# Patient Record
Sex: FEMALE | Race: WHITE | NOT HISPANIC OR LATINO | Employment: FULL TIME | URBAN - METROPOLITAN AREA
[De-identification: names, ages, dates, MRNs, and addresses within clinical notes are randomized per-mention and may not be internally consistent; named-entity substitution may affect disease eponyms.]

---

## 2021-02-27 ENCOUNTER — HOSPITAL ENCOUNTER (EMERGENCY)
Facility: MEDICAL CENTER | Age: 31
End: 2021-02-27
Attending: EMERGENCY MEDICINE
Payer: COMMERCIAL

## 2021-02-27 VITALS
DIASTOLIC BLOOD PRESSURE: 65 MMHG | TEMPERATURE: 98 F | HEART RATE: 69 BPM | OXYGEN SATURATION: 97 % | RESPIRATION RATE: 16 BRPM | WEIGHT: 169.31 LBS | BODY MASS INDEX: 22.93 KG/M2 | SYSTOLIC BLOOD PRESSURE: 106 MMHG | HEIGHT: 72 IN

## 2021-02-27 DIAGNOSIS — M51.26 LUMBAR HERNIATED DISC: ICD-10-CM

## 2021-02-27 DIAGNOSIS — M54.32 SCIATICA OF LEFT SIDE: ICD-10-CM

## 2021-02-27 PROCEDURE — 700102 HCHG RX REV CODE 250 W/ 637 OVERRIDE(OP): Performed by: EMERGENCY MEDICINE

## 2021-02-27 PROCEDURE — A9270 NON-COVERED ITEM OR SERVICE: HCPCS | Performed by: EMERGENCY MEDICINE

## 2021-02-27 PROCEDURE — 99284 EMERGENCY DEPT VISIT MOD MDM: CPT

## 2021-02-27 RX ORDER — HYDROCODONE BITARTRATE AND ACETAMINOPHEN 5; 325 MG/1; MG/1
1 TABLET ORAL ONCE
Status: COMPLETED | OUTPATIENT
Start: 2021-02-27 | End: 2021-02-27

## 2021-02-27 RX ORDER — CYCLOBENZAPRINE HCL 10 MG
10 TABLET ORAL ONCE
Status: COMPLETED | OUTPATIENT
Start: 2021-02-27 | End: 2021-02-27

## 2021-02-27 RX ORDER — IBUPROFEN 600 MG/1
600 TABLET ORAL ONCE
Status: COMPLETED | OUTPATIENT
Start: 2021-02-27 | End: 2021-02-27

## 2021-02-27 RX ORDER — METHYLPREDNISOLONE 4 MG/1
TABLET ORAL
Qty: 1 EACH | Refills: 0 | Status: SHIPPED | OUTPATIENT
Start: 2021-02-27 | End: 2021-05-24

## 2021-02-27 RX ORDER — CYCLOBENZAPRINE HCL 10 MG
10 TABLET ORAL 3 TIMES DAILY PRN
Qty: 20 TABLET | Refills: 0 | Status: SHIPPED | OUTPATIENT
Start: 2021-02-27 | End: 2021-04-05 | Stop reason: SDUPTHER

## 2021-02-27 RX ADMIN — CYCLOBENZAPRINE 10 MG: 10 TABLET, FILM COATED ORAL at 11:24

## 2021-02-27 RX ADMIN — IBUPROFEN 600 MG: 600 TABLET, FILM COATED ORAL at 11:24

## 2021-02-27 RX ADMIN — HYDROCODONE BITARTRATE AND ACETAMINOPHEN 1 TABLET: 5; 325 TABLET ORAL at 11:24

## 2021-02-27 ASSESSMENT — LIFESTYLE VARIABLES: DO YOU DRINK ALCOHOL: NO

## 2021-02-27 NOTE — ED TRIAGE NOTES
".  Chief Complaint   Patient presents with   • Low Back Pain     Left sided.    • Leg Pain     Left \" pain upper area, tingling below the knee.    Pt states was \" lifting an attraction, heard a pop, then my left foot went numb shortly after that. \"  Injury occurred last evening at 1915.  Pt ambulatory with limp and \" extreme pain. \"  Pt able to wiggle toes, \" just tingling. \"  Pain radiates \" up and down my leg. \"      Pt educated on the triage process.  Pt was instructed to notify staff for any worsening symptoms.  Pt denies any recent travel, denies exposure to COVID positive individuals.  Pt also denies any respiratory or flu like symptoms at this time.  Mask in place.     "

## 2021-02-27 NOTE — LETTER
FORM C-4:  EMPLOYEE’S CLAIM FOR COMPENSATION/ REPORT OF INITIAL TREATMENT  EMPLOYEE’S CLAIM - PROVIDE ALL INFORMATION REQUESTED   First Name Farzaneh Last Name Dave Birthdate 1990  Sex female Claim Number   Home Address [address]             [place] [address]                                   Age  31 y.o. Height  1.829 m (6') Weight  76.8 kg (169 lb 5 oz) 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 (if applicable) LIFTING A NEW GAME    How did this injury or occupational disease occur? Be specific and answer in detail. Use additional sheet if necessary)  I WAS HELPING TO ASSEMBLE THE [employer] CAR AND WHEN I LIFTED IT SOMETHING POPPED IN MY LOWER BACK   If you believe that you have an occupational disease, when did you first have knowledge of the disability and it relationship to your employment? N/A Witnesses to the Accident  MADDY MCCLENDON   Nature of Injury or Occupational Disease  Workers' Compensation Part(s) of Body Injured or Affected  Lower Back Area (Lumbar Area & Lumbo-Sacral), N/A, N/A    I CERTIFY THAT THE ABOVE IS TRUE AND CORRECT TO THE BEST OF MY KNOWLEDGE AND THAT I HAVE PROVIDED THIS INFORMATION IN ORDER TO OBTAIN THE BENEFITS OF [place]).  I HEREBY AUTHORIZE ANY PHYSICIAN, CHIROPRACTOR, SURGEON, PRACTITIONER, OR OTHER PERSON, ANY HOSPITAL, INCLUDING [facility], ANY MEDICAL SERVICE ORGANIZATION, ANY INSURANCE COMPANY, OR OTHER INSTITUTION OR ORGANIZATION TO RELEASE TO EACH OTHER, ANY MEDICAL OR OTHER INFORMATION, INCLUDING BENEFITS PAID OR PAYABLE, PERTINENT TO THIS INJURY OR DISEASE, EXCEPT INFORMATION RELATIVE TO DIAGNOSIS, TREATMENT AND/OR COUNSELING FOR AIDS, PSYCHOLOGICAL CONDITIONS, ALCOHOL OR CONTROLLED SUBSTANCES, FOR WHICH I MUST GIVE SPECIFIC AUTHORIZATION.  A PHOTOSTAT OF THIS AUTHORIZATION SHALL BE AS VALID AS THE ORIGINAL.  Date 02/27/2021                 Place   [facility]                                         Employee’s Signature   THIS REPORT MUST BE COMPLETED AND MAILED WITHIN 3 WORKING DAYS OF TREATMENT   Place [facility], EMERGENCY DEPT                       Name of Facility [facility]   Date  2/27/2021 Diagnosis  (M54.32) Sciatica of left side  (M51.26) Lumbar herniated disc Is there evidence the injured employee was under the influence of alcohol and/or another controlled substance at the time of accident?   Hour  12:45 PM Description of Injury or Disease  Sciatica of left side  Lumbar herniated disc No   Treatment  H&P  Have you advised the patient to remain off work five days or more?         No   X-Ray Findings    If Yes   From Date    To Date      From information given by the employee, together with medical evidence, can you directly connect this injury or occupational disease as job incurred? Yes If No, is employee capable of: Full Duty  No Modified Duty  Yes   Is additional medical care by a physician indicated? Yes If Modified Duty, Specify any Limitations / Restrictions   Rest today and tomorrow.  No heavy lifting over 15 pounds in the next 10 days.  Adjust work duties accordingly.    Do you know of any  "previous injury or disease contributing to this condition or occupational disease? No    Date 2/27/2021 Print Doctor’s Name Carlos Durbin certify the employer’s copy of this form was mailed on:   Address [address] [place] [address]  [place] [address] [phone] INSURER’S USE ONLY   Provider’s Tax ID Number [id number] Telephone Dept: [phone]    Doctor’s Signature CARLOS Damon M.D. Degree M.D.      Form C-4 (rev.10/07)                                                                         BRIEF DESCRIPTION OF RIGHTS AND BENEFITS  (Pursuant to [place] 616C.050)    Notice of Injury or Occupational Disease (Incident Report Form C-1): If an injury or occupational disease (OD) arises out of and in the course of employment, you must provide written notice to your employer as soon as practicable, but no later than 7 days after the accident or OD. Your employer shall maintain a sufficient supply of the required forms.    Claim for Compensation (Form C-4): If medical treatment is sought, the form C-4 is available at the place of initial treatment. A completed \"Claim for Compensation\" (Form C-4) must be filed within 90 days after an accident or OD. The treating physician or chiropractor must, within 3 working days after treatment, complete and mail to the employer, the employer's insurer and third-party , the Claim for Compensation.    Medical Treatment: If you require medical treatment for your on-the-job injury or OD, you may be required to select a physician or chiropractor from a list provided by your workers’ compensation insurer, if it has contracted with an Organization for Managed Care (MCO) or Preferred Provider Organization (PPO) or providers of health care. If your employer has not entered into a contract with an MCO or PPO, you may select a physician or chiropractor from the Panel of Physicians and Chiropractors. Any medical costs related to your industrial injury or OD will be paid by your " insurer.    Temporary Total Disability (TTD): If your doctor has certified that you are unable to work for a period of at least 5 consecutive days, or 5 cumulative days in a 20-day period, or places restrictions on you that your employer does not accommodate, you may be entitled to TTD compensation.    Temporary Partial Disability (TPD): If the wage you receive upon reemployment is less than the compensation for TTD to which you are entitled, the insurer may be required to pay you TPD compensation to make up the difference. TPD can only be paid for a maximum of 24 months.    Permanent Partial Disability (PPD): When your medical condition is stable and there is an indication of a PPD as a result of your injury or OD, within 30 days, your insurer must arrange for an evaluation by a rating physician or chiropractor to determine the degree of your PPD. The amount of your PPD award depends on the date of injury, the results of the PPD evaluation, your age and wage.    Permanent Total Disability (PTD): If you are medically certified by a treating physician or chiropractor as permanently and totally disabled and have been granted a PTD status by your insurer, you are entitled to receive monthly benefits not to exceed 66 2/3% of your average monthly wage. The amount of your PTD payments is subject to reduction if you previously received a lump-sum PPD award.    Vocational Rehabilitation Services: You may be eligible for vocational rehabilitation services if you are unable to return to the job due to a permanent physical impairment or permanent restrictions as a result of your injury or occupational disease.    Transportation and Per Ada Reimbursement: You may be eligible for travel expenses and per ada associated with medical treatment.    Reopening: You may be able to reopen your claim if your condition worsens after claim closure.     Appeal Process: If you disagree with a written determination issued by the insurer or  the insurer does not respond to your request, you may appeal to the Department of Administration, , by following the instructions contained in your determination letter. You must appeal the determination within 70 days from the date of the determination letter at [address], [address], [place], [place] [address], or [address], [place], [place] [address]. If you disagree with the  decision, you may appeal to the Department of Administration, . You must file your appeal within 30 days from the date of the  decision letter at [address], [address], [place], [place] [address], or [address], [address], [place], [place] [address]. If you disagree with a decision of an , you may file a petition for judicial review with the District Court. You must do so within 30 days of the Appeal Officer’s decision. You may be represented by an  at your own expense or you may contact the [facility] for possible representation.    [place]  for Injured Workers (NAIW): If you disagree with a  decision, you may request that NAIW represent you without charge at an  Hearing. For information regarding denial of benefits, you may contact the [facility] at: [address], [address], [place] [address], [phone], or [address], [place] [address], [phone]    To File a Complaint with the Division: If you wish to file a complaint with the  of the Division of Industrial Relations (DIR),  please contact the Workers’ Compensation Section, [address], [address], [place], [place] [address], telephone (141) [phone], or [address], [place], [place] [address], telephone [phone].    For assistance with Workers’ Compensation Issues: You may contact the [facility] Office for Consumer Health Assistance,  [address] [address] [facility] [address], [place] [address], Toll Free 1-866.787.8349, Web site: http://[facility].[place].gov/Programs/EVARISTO E-mail: evaristo@[facility].[place].gov  D-2 (rev. 10/20)              __________________________________________________________________                                    _________________            Employee Name / Signature                                                                                                                            Date

## 2021-02-27 NOTE — ED NOTES
Pt ambulated to hallway, verbalized relief from medications. She is now able to put weight left leg.   Informed .

## 2021-02-27 NOTE — ED PROVIDER NOTES
"ED Provider Note    CHIEF COMPLAINT  Chief Complaint   Patient presents with   • Low Back Pain     Left sided.    • Leg Pain     Left \" pain upper area, tingling below the knee.        HPI  Farzaneh Acosta is a 31 y.o. female who presents complaining of back pain and toe numbness.  The patient has been working on a 250 pound car yesterday.  She picked it up a few times with no difficulty but the last time she picked it up she felt a pop in her back and a stinging numb sensation go down her left lower extremity.  That has since resolved and now she has numbness over her great toe.  She has pain in her back that radiates towards her thigh.  It does not radiate towards her foot.  She never had this before.  She denies any change in bowel or bladder.  There is no numbness in the perineum.  There is no belly pain.  No fever chills.  No steroids or drug use.  There is no other complaint.    PAST MEDICAL HISTORY  None    FAMILY HISTORY  History reviewed. No pertinent family history.    SOCIAL HISTORY  Social History     Tobacco Use   • Smoking status: Never Smoker   • Smokeless tobacco: Never Used   Substance Use Topics   • Alcohol use: Yes   • Drug use: Not Currently     She is here with her friend who is a     SURGICAL HISTORY  History reviewed. No pertinent surgical history.    CURRENT MEDICATIONS    I have reviewed the nurses notes and/or the list brought with the patient.    ALLERGIES  Allergies   Allergen Reactions   • Sulfa Drugs Vomiting     \" I projectile vomit. \"        REVIEW OF SYSTEMS  See HPI for further details. Review of systems as above, otherwise all other systems are negative.     PHYSICAL EXAM  VITAL SIGNS: /66   Pulse 72   Temp 36.7 °C (98 °F) (Temporal)   Resp 16   Ht 1.829 m (6')   Wt 76.8 kg (169 lb 5 oz)   LMP 02/13/2021 (Approximate)   SpO2 100%   BMI 22.96 kg/m²     Constitutional: Well appearing patient in no acute distress.  Not toxic, nor ill in appearance.  HENT: Mucus " membranes moist.  Oropharynx is clear.  Eyes: Pupils equally round.  No scleral icterus.   Neck: Full nontender range of motion.  Lymphatic: No cervical lymphadenopathy noted.   Cardiovascular: Regular heart rate and rhythm.  No murmurs, rubs, nor gallop appreciated.   Thorax & Lungs: Chest is nontender.  Lungs are clear to auscultation with good air movement bilaterally.  No wheeze, rhonchi, nor rales.   Abdomen: Soft, with no tenderness, rebound nor guarding.  No mass, pulsatile mass, nor hepatosplenomegaly appreciated.  Skin: No purpura nor petechia noted.  Extremities/Musculoskeletal: No sign of trauma.  Calves are nontender with no cords nor edema.  No Destin's sign.  Pulses are intact all around.   Neurologic: Alert & oriented.  Strength and sensation is intact all around with exception of some numbness over her great toe.  Deep tendon reflexes are brisk and symmetric at the knee and ankle.  With Breanna as chaperone, I did check the perineum and there is no saddle anesthesia.  Straight leg raise elicits pain but does not cause radicular symptoms.  Gait is slow but steady according nursing report.  Psychiatric: Normal affect appropriate for the clinical situation.      MEDICAL RECORD  I have reviewed patient's medical record and pertinent results are listed above.    COURSE & MEDICAL DECISION MAKING  I have reviewed any medical record information, laboratory studies and radiographic results as noted above.  This patient presents with sudden onset of back pain while lifting with radicular symptoms.  Suspect an L4/5 disc herniation.  She has no red flag symptoms of epidural abscess or cord compression.  At this point I will treat her symptomatically.  She prefers oral therapy initially.  We will start with NSAIDs, antispasmodics and will give her a dose of hydrocodone.  Going forward, my plan will be to treat her with Medrol, and Flexeril.  We discussed red flag symptoms, and following up with occupational health  possibility of MRI depending on how she responds to therapy.    She feels better after this.  At this point, we will discharge her home.  I have asked her to follow-up with occupational health this upcoming week.  She is to rest for the first 1 to 2 days and then no heavy lifting over 15 pounds for the next 10 hours.    Instructions on sciatica as well as disc herniation.    FINAL IMPRESSION  1. Sciatica of left side    2. Lumbar herniated disc           This dictation was created using voice recognition software.    Electronically signed by: Carlos Durbin M.D., 2/27/2021 11:15 AM

## 2021-02-27 NOTE — ED NOTES
Pt discharge home. Pt given discharge instructions and prescription. Pt verbalized understanding, all questions answered ,vss upon d/c. Pt steady on feet upon discharge  Friend will be driving pt home

## 2021-04-05 ENCOUNTER — OCCUPATIONAL MEDICINE (OUTPATIENT)
Dept: OCCUPATIONAL MEDICINE | Facility: CLINIC | Age: 31
End: 2021-04-05
Payer: COMMERCIAL

## 2021-04-05 VITALS
DIASTOLIC BLOOD PRESSURE: 70 MMHG | RESPIRATION RATE: 16 BRPM | WEIGHT: 172 LBS | SYSTOLIC BLOOD PRESSURE: 114 MMHG | HEART RATE: 87 BPM | OXYGEN SATURATION: 100 % | TEMPERATURE: 98.3 F | BODY MASS INDEX: 23.3 KG/M2 | HEIGHT: 72 IN

## 2021-04-05 DIAGNOSIS — M54.16 LUMBAR RADICULOPATHY: ICD-10-CM

## 2021-04-05 DIAGNOSIS — M54.32 SCIATICA OF LEFT SIDE: ICD-10-CM

## 2021-04-05 PROCEDURE — 99213 OFFICE O/P EST LOW 20 MIN: CPT | Performed by: NURSE PRACTITIONER

## 2021-04-05 RX ORDER — CYCLOBENZAPRINE HCL 10 MG
10 TABLET ORAL 3 TIMES DAILY PRN
Qty: 20 TABLET | Refills: 0 | Status: SHIPPED | OUTPATIENT
Start: 2021-04-05 | End: 2021-05-24

## 2021-04-05 RX ORDER — DICLOFENAC SODIUM 75 MG/1
75 TABLET, DELAYED RELEASE ORAL 2 TIMES DAILY
Qty: 60 TABLET | Refills: 0 | Status: SHIPPED | OUTPATIENT
Start: 2021-04-05 | End: 2021-05-24

## 2021-04-05 NOTE — LETTER
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    Medical Information   Related to Industrial Injury? Yes    Subjective Complaints:  DOI 2/26/21: complaining of back pain and toe numbness.  The patient has been working on a 250 pound car yesterday.  She picked it up a few times with no difficulty but the last time she picked it up she felt a pop in her back and a stinging numb sensation go down her left lower extremity.  Today no improvement.  Pain is radiating from the left to the right lower back, then down the left groin to mid thigh, and sometimes to the toe. Patient denies leg weakness, saddle anesthesia, or bowel bladder dysfunction. Patient states that she is taking cyclobenzaprine at night with some relief. She states that she the prednisone took some of the edge off. Patient is using heat with mild relief. She is taking Ibuprofen with no relief. She has not had any follow-up since the ED. She has been light duty for about 10 days, but has been lifting 25lb backpacks repeatedly which significantly increased her symptoms. Placed on light duty at this time. MRI and neurosurgery referral placed. Plan of care discussed with patient.    Objective Findings: Lumbar: No gross deformity noted.  Moderate tenderness to paraspinal musculature L3-S1 and left SI joint. Moderately decreased flexion or rotation.  Straight leg test positive on the left, neg on the right.  Unable to heel/toe walk with moderate difficulty. Cranial nerves grossly intact.  Achilles and patellar reflexes 2+ bilaterally.   Sensation intact. No gait abnormalities, slow and steady.      Pre-Existing Condition(s):     Assessment:   Condition Worsened    Status: Discharged / Care Transfer  Permanent Disability:No    Plan: Medication (NOT at Work)Transfer [facility]    Diagnostics: MRI    Comments:  Follow-up in 1 week  Restricted duty   MRI ordered   Neurosurgery referral placed, transfer   Take Diclofenac with food as prescribed  Take cyclobenzaprine as prescribed DO NOT DRIVE or WORK while taking this medication due to sedating effects   Jarod  mmend OTC Tylenol/Ibuprofen for breakthrough, ice/heat application, and OTC topical ointments ie Voltaren Gel, Bio-freeze, or Tiger Balm  Recommend gentle range of motion and stretching exercises as tolerated       Pt understands return to the ED imm  ediately for immediate reevaluation for increased pain, numbness/weakness of lower extremities, or incontinence of bowel or bladder.         Disability Information   Status: Released to Restricted Duty    From:  2021  Through: 2021 Restrictions are: Temporary   Physical Restrictions   Sitting:  < or = to 2 hrs/day Standing:  < or = to 2 hrs/day Stooping:  < or = to 1 hr/day Bending:  < or = to 1 hr/day   Squattin hrs/day Walking:  < or = to 2 hrs/day Climbin hrs/day Pushin hrs/day   Pullin hrs/day Other:    Reaching Above Shoulder (L):   Reaching Above Shoulder (R):       Reaching Below Shoulder (L):    Reaching Below Shoulder (R):      Not to exceed Weight Limits   Carrying(hrs):   Weight Limit(lb): < or = to 10 pounds Lifting(hrs):   Weight  Limit(lb): < or = to 10 pounds   Comments: Recommend rotating sitting, standing, and walking every 2 hours with a 10 minute break     Repetitive Actions   Hands: i.e. Fine Manipulations from Grasping:     Feet: i.e. Operating Foot Controls:        Driving / Operate Machinery:     Provider Name:   PATRICIO Nielsen Physician Signature:  Physician Name:     Clinic Name / Location: [address],   [address]  [place] [address] Clinic Phone Number: Dept: [phone]   Appointment Time: 1:30 Pm Visit Start Time: 1:21 PM   Check-In Time:  1:21 Pm Visit Discharge Time:  2:06pm   Original-Treating Physician or Chiropractor    Page 2-Insurer/TPA    Page 3-Employer    Page 4-Employee

## 2021-04-05 NOTE — PROGRESS NOTES
"Subjective:     Farzaneh Acosta is a 31 y.o. female who presents for Follow-Up (WC New To You DOI 2/26/2021 Back; Same RM 16)      DOI 2/26/21: complaining of back pain and toe numbness.  The patient has been working on a 250 pound car yesterday.  She picked it up a few times with no difficulty but the last time she picked it up she felt a pop in her back and a stinging numb sensation go down her left lower extremity.  Today no improvement.  Pain is radiating from the left to the right lower back, then down the left groin to mid thigh, and sometimes to the toe. Patient denies leg weakness, saddle anesthesia, or bowel bladder dysfunction. Patient states that she is taking cyclobenzaprine at night with some relief. She states that she the prednisone took some of the edge off. Patient is using heat with mild relief. She is taking Ibuprofen with no relief. She has not had any follow-up since the ED. She has been light duty for about 10 days, but has been lifting 25lb backpacks repeatedly which significantly increased her symptoms. Placed on light duty at this time. MRI and neurosurgery referral placed. Plan of care discussed with patient.     ROS: All systems were reviewed on intake form, form was reviewed and signed. See scanned documents in media. Pertinent positives and negatives included in HPI.    PMH: No pertinent past medical history to this problem  MEDS: Medications were reviewed in Epic  ALLERGIES:   Allergies   Allergen Reactions   • Sulfa Drugs Vomiting     \" I projectile vomit. \"      SOCHX: Works as  at [employer]  FH: No pertinent family history to this problem       Objective:     /70   Pulse 87   Temp 36.8 °C (98.3 °F)   Resp 16   Ht 1.829 m (6')   Wt 78 kg (172 lb)   SpO2 100%   BMI 23.33 kg/m²     [unfilled]    Lumbar: No gross deformity noted.  Moderate tenderness to paraspinal musculature L3-S1 and left SI joint. Moderately decreased flexion or " rotation.  Straight leg test positive on the left, neg on the right. Unable to heel/toe walk with moderate difficulty. Cranial nerves grossly intact.  Achilles and patellar reflexes 2+ bilaterally.   Sensation intact. No gait abnormalities, slow and steady.       Assessment/Plan:       1. Lumbar radiculopathy  - MR-LUMBAR SPINE-W/O; Future  - REFERRAL TO RADIOLOGY  - REFERRAL TO NEUROSURGERY  - diclofenac DR (VOLTAREN) 75 MG Tablet Delayed Response; Take 1 tablet by mouth 2 times a day.  Dispense: 60 tablet; Refill: 0  - cyclobenzaprine (FLEXERIL) 10 mg Tab; Take 1 tablet by mouth 3 times a day as needed for Muscle Spasms.  Dispense: 20 tablet; Refill: 0    2. Sciatica of left side  - MR-LUMBAR SPINE-W/O; Future  - REFERRAL TO RADIOLOGY  - REFERRAL TO NEUROSURGERY  - diclofenac DR (VOLTAREN) 75 MG Tablet Delayed Response; Take 1 tablet by mouth 2 times a day.  Dispense: 60 tablet; Refill: 0  - cyclobenzaprine (FLEXERIL) 10 mg Tab; Take 1 tablet by mouth 3 times a day as needed for Muscle Spasms.  Dispense: 20 tablet; Refill: 0    Released to Restricted Duty FROM 4/5/2021 TO 4/12/2021  Recommend rotating sitting, standing, and walking every 2 hours with a 10 minute break   Follow-up in 1 week  Restricted duty   MRI ordered   Neurosurgery referral placed, transfer   Take Diclofenac with food as prescribed  Take cyclobenzaprine as prescribed DO NOT DRIVE or WORK while taking this medication due to sedating effects   Jarod  mmend OTC Tylenol/Ibuprofen for breakthrough, ice/heat application, and OTC topical ointments ie Voltaren Gel, Bio-freeze, or Tiger Balm  Recommend gentle range of motion and stretching exercises as tolerated       Pt understands return to the ED imm  ediately for immediate reevaluation for increased pain, numbness/weakness of lower extremities, or incontinence of bowel or bladder.         Differential diagnosis, natural history, supportive care, and indications for immediate follow-up  discussed.    Approximately 25 minutes were spent in reviewing notes, preparing for visit, obtaining history, exam and evaluation, patient counseling/education and post visit documentation/orders.

## 2021-04-12 ENCOUNTER — OCCUPATIONAL MEDICINE (OUTPATIENT)
Dept: OCCUPATIONAL MEDICINE | Facility: CLINIC | Age: 31
End: 2021-04-12
Payer: COMMERCIAL

## 2021-04-12 VITALS
RESPIRATION RATE: 16 BRPM | BODY MASS INDEX: 22.48 KG/M2 | HEIGHT: 72 IN | SYSTOLIC BLOOD PRESSURE: 118 MMHG | WEIGHT: 166 LBS | HEART RATE: 66 BPM | DIASTOLIC BLOOD PRESSURE: 78 MMHG | TEMPERATURE: 98.9 F | OXYGEN SATURATION: 98 %

## 2021-04-12 DIAGNOSIS — M54.16 LUMBAR RADICULOPATHY: ICD-10-CM

## 2021-04-12 DIAGNOSIS — M54.32 SCIATICA OF LEFT SIDE: ICD-10-CM

## 2021-04-12 PROCEDURE — 99213 OFFICE O/P EST LOW 20 MIN: CPT | Performed by: NURSE PRACTITIONER

## 2021-04-12 ASSESSMENT — ENCOUNTER SYMPTOMS
SENSORY CHANGE: 1
TINGLING: 0
WEAKNESS: 0
CARDIOVASCULAR NEGATIVE: 1
CONSTITUTIONAL NEGATIVE: 1
BACK PAIN: 1
PSYCHIATRIC NEGATIVE: 1
RESPIRATORY NEGATIVE: 1
MYALGIAS: 1

## 2021-04-12 NOTE — LETTER
[address],   [address] [place] [address]  Phone:  [phone] - Fax:  [phone]   Occupational Health [facility] Progress Report and Disability Certification  Date of Service: 4/12/2021   No Show:  No  Date / Time of Next Visit: 5/3/2021 @ 3pm   Claim Information   Patient Name: Farzaneh Acosta  Claim Number:     Employer:  [employer] Date of Injury:      Insurer / TPA: [employer]  ID / SSN:     Occupation:   Diagnosis: Diagnoses of Lumbar radiculopathy and Sciatica of left side were pertinent to this visit.    Medical Information   Related to Industrial Injury? Yes    Subjective Complaints:  DOI 2/26/21: complaining of back pain and toe numbness.  The patient has been working on a 250 pound car yesterday.  She picked it up a few times with no difficulty but the last time she picked it up she felt a pop in her back and a stinging numb sensation go down her left lower extremity.  Today mild improvement.  Pain is no longer really radiating from the right to the left lower back, but she does continue to have some sciatica that radiates down her left groin to her mid thigh.  She states that radiation down the left groin has become less frequent than previously with no continued radiation to the toe. Patient denies leg weakness, saddle anesthesia, or bowel bladder dysfunction. Patient states that she is taking cyclobenzaprine at night with some relief. She states that she the prednisone took some of the edge off. Patient is using heat and diclofenac with mild relief.  She has been tolerating light duty without difficulty, feels that this is significantly improved her symptoms from previous visit. MRI results reviewed with patient.  Feel that physiatry referral will be more appropriate to neurosurgery based on MRI exam findings.  Physiatry referral placed for further evaluation and management.  Physical therapy referral placed.  Plan of care  discussed with patient.    Objective Findings: Lumbar: No gross deformity noted.  Mild tenderness to paraspinal musculature L3-S1 and left SI joint.  Mildly decreased flexion or rotation.  Straight leg test positive on the left, neg on the right.  Able to heel/toe walk with minimal. Cranial nerves grossly intact.  Achilles and patellar reflexes 2+ bilaterally.   Sensation intact. No gait abnormalities, slow and steady.     MRI lumbar 2021:  Impression:  Mild degenerative endplate changes and facet arthropathy but no nerve root impingement to suggest radiculopathy.   Pre-Existing Condition(s):     Assessment:   Condition Improved    Status: Discharged / Care Transfer  Permanent Disability:No    Plan: PTMedication (NOT at Work)MedicationTransfer Care    Diagnostics:      Comments:  Follow-up in 3 weeks, if not seen by physiatry  Restricted duty, per physiatry  Physiatry referral placed, transfer care  Physical therapy referral placed  Take Diclofenac with food as prescribed   Take cyclobenzaprine as prescribed DO NOT DRIVE or W  ORK while taking this medication due to sedating effects   Recommend OTC Tylenol/Ibuprofen for breakthrough, ice/heat application, and OTC topical ointments ie Voltaren Gel, Bio-freeze, or Tiger Balm   Recommend gentle range of motion and stretching   exercises as tolerated             Pt understands return to the ED immediately for immediate reevaluation for increased pain, numbness/weakness of lower extremities, or incontinence of bowel or bladder.        Disability Information   Status: Released to Restricted Duty    From:  2021  Through: 5/3/2021 Restrictions are: Temporary   Physical Restrictions   Sitting:  < or = to 2 hrs/day Standing:  < or = to 2 hrs/day Stooping:  < or = to 1 hr/day Bending:  < or = to 1 hr/day   Squattin hrs/day Walking:  < or = to 2 hrs/day Climbin hrs/day Pushin hrs/day   Pullin hrs/day Other:    Reaching Above Shoulder (L):   Reaching  Above Shoulder (R):       Reaching Below Shoulder (L):    Reaching Below Shoulder (R):      Not to exceed Weight Limits   Carrying(hrs):   Weight Limit(lb): < or = to 10 pounds Lifting(hrs):   Weight  Limit(lb): < or = to 10 pounds   Comments: Recommend rotating sitting, standing, and walking every 2 hours with a 10 minute break     Repetitive Actions   Hands: i.e. Fine Manipulations from Grasping:     Feet: i.e. Operating Foot Controls:     Driving / Operate Machinery:     Provider Name:   PATRICIO Nielsen Physician Signature:  Physician Name:     Clinic Name / Location: [address],   [address] [place] [id number] Clinic Phone Number: Dept: [phone]   Appointment Time: 3:00 Pm Visit Start Time: 3:01 PM   Check-In Time:  2:56 Pm Visit Discharge Time:  3:40am   Original-Treating Physician or Chiropractor    Page 2-Insurer/TPA    Page 3-Employer    Page 4-Employee

## 2021-04-13 NOTE — PROGRESS NOTES
Subjective:     Farzaneh Acosta is a 31 y.o. female who presents for Follow-Up (WC DOI: 2/26/2021 Back; Better RM 17)      DOI 2/26/21: complaining of back pain and toe numbness.  The patient has been working on a 250 pound car yesterday.  She picked it up a few times with no difficulty but the last time she picked it up she felt a pop in her back and a stinging numb sensation go down her left lower extremity.  Today mild improvement.  Pain is no longer really radiating from the right to the left lower back, but she does continue to have some sciatica that radiates down her left groin to her mid thigh.  She states that radiation down the left groin has become less frequent than previously with no continued radiation to the toe. Patient denies leg weakness, saddle anesthesia, or bowel bladder dysfunction. Patient states that she is taking cyclobenzaprine at night with some relief. She states that she the prednisone took some of the edge off. Patient is using heat and diclofenac with mild relief.  She has been tolerating light duty without difficulty, feels that this is significantly improved her symptoms from previous visit. MRI results reviewed with patient.  Feel that physiatry referral will be more appropriate to neurosurgery based on MRI exam findings.  Physiatry referral placed for further evaluation and management.  Physical therapy referral placed.  Plan of care discussed with patient.     Review of Systems   Constitutional: Negative.    Respiratory: Negative.    Cardiovascular: Negative.    Musculoskeletal: Positive for back pain and myalgias. Negative for joint pain.   Skin: Negative.    Neurological: Positive for sensory change. Negative for tingling and weakness.   Psychiatric/Behavioral: Negative.        SOCHX: Works as  at [employer]  FH: No pertinent family history to this problem       Objective:     /78   Pulse 66   Temp 37.2 °C (98.9 °F)   Resp 16   Ht  1.829 m (6')   Wt 75.3 kg (166 lb)   SpO2 98%   BMI 22.51 kg/m²     Constitutional: Patient is in no acute distress. Appears well-developed and well-nourished.   Cardiovascular: Normal rate.    Pulmonary/Chest: Effort normal. No respiratory distress.   Neurological: Patient is alert and oriented to person, place, and time.   Skin: Skin is warm and dry.   Psychiatric: Normal mood and affect. Behavior is normal.     Lumbar: No gross deformity noted.  Mild tenderness to paraspinal musculature L3-S1 and left SI joint.  Mildly decreased flexion or rotation.  Straight leg test positive on the left, neg on the right.  Able to heel/toe walk with minimal. Cranial nerves grossly intact.  Achilles and patellar reflexes 2+ bilaterally.   Sensation intact. No gait abnormalities, slow and steady.     MRI lumbar 4/8/2021:  Impression:  Mild degenerative endplate changes and facet arthropathy but no nerve root impingement to suggest radiculopathy.    Assessment/Plan:       1. Lumbar radiculopathy  - REFERRAL TO OUTPATIENT INTERVENTIONAL PAIN CLINIC  - REFERRAL TO PHYSICAL THERAPY    2. Sciatica of left side  - REFERRAL TO OUTPATIENT INTERVENTIONAL PAIN CLINIC  - REFERRAL TO PHYSICAL THERAPY    Released to Restricted Duty FROM 4/12/2021 TO 5/3/2021  Recommend rotating sitting, standing, and walking every 2 hours with a 10 minute break     Follow-up in 3 weeks, if not seen by physiatry  Restricted duty, per physiatry  Physiatry referral placed, transfer care  Physical therapy referral placed  Take Diclofenac with food as prescribed   Take cyclobenzaprine as prescribed DO NOT DRIVE or W  ORK while taking this medication due to sedating effects   Recommend OTC Tylenol/Ibuprofen for breakthrough, ice/heat application, and OTC topical ointments ie Voltaren Gel, Bio-freeze, or Tiger Balm   Recommend gentle range of motion and stretching   exercises as tolerated             Pt understands return to the ED immediately for immediate  reevaluation for increased pain, numbness/weakness of lower extremities, or incontinence of bowel or bladder.        Differential diagnosis, natural history, supportive care, and indications for immediate follow-up discussed.    Approximately 25 minutes was spent in preparing for visit, obtaining history, exam and evaluation, patient counseling/education and post visit documentation/orders.

## 2021-05-24 ENCOUNTER — OFFICE VISIT (OUTPATIENT)
Dept: URGENT CARE | Facility: PHYSICIAN GROUP | Age: 31
End: 2021-05-24

## 2021-05-24 VITALS
TEMPERATURE: 99.3 F | SYSTOLIC BLOOD PRESSURE: 108 MMHG | HEIGHT: 72 IN | RESPIRATION RATE: 18 BRPM | DIASTOLIC BLOOD PRESSURE: 64 MMHG | OXYGEN SATURATION: 98 % | HEART RATE: 87 BPM | BODY MASS INDEX: 22.48 KG/M2 | WEIGHT: 166 LBS

## 2021-05-24 DIAGNOSIS — Z3A.01 LESS THAN 8 WEEKS GESTATION OF PREGNANCY: ICD-10-CM

## 2021-05-24 DIAGNOSIS — N91.2 AMENORRHEA: ICD-10-CM

## 2021-05-24 LAB
INT CON NEG: NORMAL
INT CON POS: NORMAL
POC URINE PREGNANCY TEST: POSITIVE

## 2021-05-24 PROCEDURE — 99203 OFFICE O/P NEW LOW 30 MIN: CPT | Performed by: STUDENT IN AN ORGANIZED HEALTH CARE EDUCATION/TRAINING PROGRAM

## 2021-05-24 PROCEDURE — 81025 URINE PREGNANCY TEST: CPT | Performed by: STUDENT IN AN ORGANIZED HEALTH CARE EDUCATION/TRAINING PROGRAM

## 2021-05-24 NOTE — PROGRESS NOTES
"Subjective:   CHIEF COMPLAINT  Chief Complaint   Patient presents with   • Pregnancy Test     wants confirmation of pregnancy test, positive test at home on saturday        HPI  Farzaneh Acosta is a 31 y.o. female who presents requesting pregnancy test.  She reports she had a positive test at home.  Her last first day of her menstrual period was .  She has 2 other children.  Just moved to the area and does not have an OB/GYN.    REVIEW OF SYSTEMS  General: no fever or chills  GI: no nausea or vomiting  See HPI for further details.    PAST MEDICAL HISTORY  There are no problems to display for this patient.      SURGICAL HISTORY  patient denies any surgical history    ALLERGIES  Allergies   Allergen Reactions   • Sulfa Drugs Vomiting     \" I projectile vomit. \"        CURRENT MEDICATIONS  Home Medications     Reviewed by Brian Valentino D.O. (Physician) on 21 at [facility]  Med List Status: <None>   Medication Last Dose Status        Patient Randal Taking any Medications                       SOCIAL HISTORY  Social History     Tobacco Use   • Smoking status: Never Smoker   • Smokeless tobacco: Never Used   Substance and Sexual Activity   • Alcohol use: Yes   • Drug use: Not Currently   • Sexual activity: Not on file       FAMILY HISTORY  No family history on file.       Objective:   PHYSICAL EXAM  VITAL SIGNS: /64 (BP Location: Left arm, Patient Position: Sitting, BP Cuff Size: Adult)   Pulse 87   Temp 37.4 °C (99.3 °F) (Temporal)   Resp 18   Ht 1.829 m (6')   Wt 75.3 kg (166 lb)   SpO2 98%   BMI 22.51 kg/m²     Gen: no acute distress, normal voice  Skin: dry, intact, moist mucosal membranes  Lungs: CTAB w/ symmetric expansion  CV: RRR w/o murmurs or clicks  Psych: normal affect, normal judgement, alert, awake    hCG: Positive    Assessment/Plan:     1. Amenorrhea  POCT Pregnancy    REFERRAL TO OB/GYN   2. Less than 8 weeks gestation of pregnancy  REFERRAL TO OB/GYN    now@ 4wk 1 day. FLMP " 4/25/21.  Unplanned; patient was on birth control.  She is new to the area.  -ordered referral to establish with OB/GYN for continued management.    Differential diagnosis, natural history, supportive care, and indications for immediate follow-up discussed. All questions answered. Patient agrees with the plan of care.    Follow-up as needed if symptoms worsen or fail to improve to PCP, Urgent care or Emergency Room.    Please note that this dictation was created using voice recognition software. I have made a reasonable attempt to correct obvious errors, but I expect that there are errors of grammar and possibly content that I did not discover before finalizing the note.

## 2021-05-24 NOTE — PROGRESS NOTES
"Subjective:   CHIEF COMPLAINT  Chief Complaint   Patient presents with   • Pregnancy Test     wants confirmation of pregnancy test, positive test at home on saturday        HPI  Farzaneh Acosta is a 31 y.o. female who presents ***    REVIEW OF SYSTEMS  General: no fever or chills  GI: no nausea or vomiting  See HPI for further details.    PAST MEDICAL HISTORY  There are no problems to display for this patient.      SURGICAL HISTORY  patient denies any surgical history    ALLERGIES  Allergies   Allergen Reactions   • Sulfa Drugs Vomiting     \" I projectile vomit. \"        CURRENT MEDICATIONS  Home Medications     Reviewed by Brian Valentino D.O. (Physician) on 05/24/21 at 1017  Med List Status: <None>   Medication Last Dose Status        Patient Randal Taking any Medications                       SOCIAL HISTORY  Social History     Tobacco Use   • Smoking status: Never Smoker   • Smokeless tobacco: Never Used   Substance and Sexual Activity   • Alcohol use: Yes   • Drug use: Not Currently   • Sexual activity: Not on file       FAMILY HISTORY  No family history on file.       Objective:   PHYSICAL EXAM  VITAL SIGNS: /64 (BP Location: Left arm, Patient Position: Sitting, BP Cuff Size: Adult)   Pulse 87   Temp 37.4 °C (99.3 °F) (Temporal)   Resp 18   Ht 1.829 m (6')   Wt 75.3 kg (166 lb)   SpO2 98%   BMI 22.51 kg/m²     Gen: no acute distress, normal voice  Skin: dry, intact, moist mucosal membranes  Lungs: CTAB w/ symmetric expansion  CV: RRR w/o murmurs or clicks  Psych: normal affect, normal judgement, alert, awake    Assessment/Plan:     1. Amenorrhea  POCT Pregnancy       Differential diagnosis, natural history, supportive care, and indications for immediate follow-up discussed. All questions answered. Patient agrees with the plan of care.    Follow-up as needed if symptoms worsen or fail to improve to PCP, Urgent care or Emergency Room.    Please note that this dictation was created using voice " recognition software. I have made a reasonable attempt to correct obvious errors, but I expect that there are errors of grammar and possibly content that I did not discover before finalizing the note.

## 2021-06-14 ENCOUNTER — OFFICE VISIT (OUTPATIENT)
Dept: URGENT CARE | Facility: PHYSICIAN GROUP | Age: 31
End: 2021-06-14
Payer: COMMERCIAL

## 2021-06-14 VITALS
RESPIRATION RATE: 20 BRPM | BODY MASS INDEX: 22.48 KG/M2 | HEART RATE: 99 BPM | OXYGEN SATURATION: 99 % | SYSTOLIC BLOOD PRESSURE: 98 MMHG | WEIGHT: 166 LBS | HEIGHT: 72 IN | TEMPERATURE: 99.6 F | DIASTOLIC BLOOD PRESSURE: 48 MMHG

## 2021-06-14 DIAGNOSIS — S93.491A SPRAIN OF ANTERIOR TALOFIBULAR LIGAMENT OF RIGHT ANKLE, INITIAL ENCOUNTER: ICD-10-CM

## 2021-06-14 PROCEDURE — 99213 OFFICE O/P EST LOW 20 MIN: CPT | Performed by: FAMILY MEDICINE

## 2021-06-14 NOTE — PROGRESS NOTES
Subjective:      Farzaneh Acosta is a 31 y.o. female who presents with Ankle Injury (stepped down off of stair, injuring right ankle x 2.5 hours patient states hse is 7 weeks pregnant)    - This is a pleasant and nontoxic appearing 31 y.o. female with c/o rolled/twisted Rt foot today stepping down from curb. Hurts to put weight on it and walk.       ALLERGIES:  Sulfa drugs     PMH:  History reviewed. No pertinent past medical history.     PSH:  History reviewed. No pertinent surgical history.    MEDS:    Current Outpatient Medications:   •  Prenatal Vit-Fe Fumarate-FA (PRENATAL VITAMINS PO), Take  by mouth., Disp: , Rfl:     ** I have documented what I find to be significant in regards to past medical, social, family and surgical history  in my HPI or under PMH/PSH/FH review section, otherwise it is noncontributory **             HPI    Review of Systems   Musculoskeletal: Positive for joint pain.   All other systems reviewed and are negative.         Objective:     BP (!) 98/48   Pulse 99   Temp 37.6 °C (99.6 °F)   Resp 20   Ht 1.829 m (6')   Wt 75.3 kg (166 lb)   LMP  (Approximate)   SpO2 99%   BMI 22.51 kg/m²      Physical Exam  Vitals and nursing note reviewed.   Constitutional:       General: She is not in acute distress.     Appearance: Normal appearance. She is well-developed.   HENT:      Head: Normocephalic and atraumatic.   Cardiovascular:      Heart sounds: Normal heart sounds. No murmur heard.     Pulmonary:      Effort: Pulmonary effort is normal. No respiratory distress.   Musculoskeletal:        Feet:    Feet:      Comments: Rt foot: no discoloration or wounds, trace edema. Some TTP over marked areas. Limited ROM due to pain. Normal cap refill   Skin:     Coloration: Skin is not jaundiced or pale.   Neurological:      Mental Status: She is alert.      Motor: No abnormal muscle tone.   Psychiatric:         Mood and Affect: Mood normal.         Behavior: Behavior normal.       Assessment/Plan:           1. Sprain of anterior talofibular ligament of right ankle, initial encounter  REFERRAL TO SPORTS MEDICINE    CANCELED: DX-ANKLE 3+ VIEWS RIGHT     ** declined X-Ray tat this time as in 7wks pregnant and worried about radiation risk to fetus     - Dx, plan & d/c instructions discussed w/ patient and/or family members  - RICE, OTC Tylenol as needed  - E.R. precautions discussed     Follow up with sports-med in 2-3 days, ER if not improving or feeling/getting worse.    Any realistic side effects of medications that may have been given today reviewed.     Patient left in stable condition     Please note that this dictation was created using voice recognition software. I have made every reasonable attempt to correct obvious errors, but I expect that there are errors of grammar and possibly content that I did not discover before finalizing the note.

## 2022-12-15 PROCEDURE — 99284 EMERGENCY DEPT VISIT MOD MDM: CPT

## 2022-12-16 ENCOUNTER — APPOINTMENT (OUTPATIENT)
Dept: RADIOLOGY | Facility: MEDICAL CENTER | Age: 32
End: 2022-12-16
Attending: EMERGENCY MEDICINE
Payer: COMMERCIAL

## 2022-12-16 ENCOUNTER — HOSPITAL ENCOUNTER (EMERGENCY)
Facility: MEDICAL CENTER | Age: 32
End: 2022-12-16
Attending: EMERGENCY MEDICINE
Payer: COMMERCIAL

## 2022-12-16 VITALS
SYSTOLIC BLOOD PRESSURE: 110 MMHG | TEMPERATURE: 98 F | WEIGHT: 168.87 LBS | HEART RATE: 85 BPM | RESPIRATION RATE: 17 BRPM | OXYGEN SATURATION: 98 % | HEIGHT: 72 IN | DIASTOLIC BLOOD PRESSURE: 65 MMHG | BODY MASS INDEX: 22.87 KG/M2

## 2022-12-16 DIAGNOSIS — O20.0 THREATENED MISCARRIAGE: ICD-10-CM

## 2022-12-16 LAB
ALBUMIN SERPL BCP-MCNC: 4.5 G/DL (ref 3.2–4.9)
ALBUMIN/GLOB SERPL: 1.5 G/DL
ALP SERPL-CCNC: 66 U/L (ref 30–99)
ALT SERPL-CCNC: 14 U/L (ref 2–50)
ANION GAP SERPL CALC-SCNC: 10 MMOL/L (ref 7–16)
AST SERPL-CCNC: 19 U/L (ref 12–45)
B-HCG SERPL-ACNC: 149 MIU/ML (ref 0–5)
BASOPHILS # BLD AUTO: 0.5 % (ref 0–1.8)
BASOPHILS # BLD: 0.03 K/UL (ref 0–0.12)
BILIRUB SERPL-MCNC: 0.5 MG/DL (ref 0.1–1.5)
BUN SERPL-MCNC: 9 MG/DL (ref 8–22)
CALCIUM ALBUM COR SERPL-MCNC: 9.5 MG/DL (ref 8.5–10.5)
CALCIUM SERPL-MCNC: 9.9 MG/DL (ref 8.5–10.5)
CHLORIDE SERPL-SCNC: 103 MMOL/L (ref 96–112)
CO2 SERPL-SCNC: 26 MMOL/L (ref 20–33)
CREAT SERPL-MCNC: 0.57 MG/DL (ref 0.5–1.4)
EOSINOPHIL # BLD AUTO: 0.08 K/UL (ref 0–0.51)
EOSINOPHIL NFR BLD: 1.2 % (ref 0–6.9)
ERYTHROCYTE [DISTWIDTH] IN BLOOD BY AUTOMATED COUNT: 42 FL (ref 35.9–50)
GFR SERPLBLD CREATININE-BSD FMLA CKD-EPI: 124 ML/MIN/1.73 M 2
GLOBULIN SER CALC-MCNC: 3.1 G/DL (ref 1.9–3.5)
GLUCOSE SERPL-MCNC: 102 MG/DL (ref 65–99)
HCG SERPL QL: POSITIVE
HCT VFR BLD AUTO: 43.4 % (ref 37–47)
HGB BLD-MCNC: 14.2 G/DL (ref 12–16)
IMM GRANULOCYTES # BLD AUTO: 0.02 K/UL (ref 0–0.11)
IMM GRANULOCYTES NFR BLD AUTO: 0.3 % (ref 0–0.9)
LYMPHOCYTES # BLD AUTO: 2 K/UL (ref 1–4.8)
LYMPHOCYTES NFR BLD: 30.4 % (ref 22–41)
MCH RBC QN AUTO: 30.1 PG (ref 27–33)
MCHC RBC AUTO-ENTMCNC: 32.7 G/DL (ref 33.6–35)
MCV RBC AUTO: 92.1 FL (ref 81.4–97.8)
MONOCYTES # BLD AUTO: 0.36 K/UL (ref 0–0.85)
MONOCYTES NFR BLD AUTO: 5.5 % (ref 0–13.4)
NEUTROPHILS # BLD AUTO: 4.08 K/UL (ref 2–7.15)
NEUTROPHILS NFR BLD: 62.1 % (ref 44–72)
NRBC # BLD AUTO: 0 K/UL
NRBC BLD-RTO: 0 /100 WBC
PLATELET # BLD AUTO: 235 K/UL (ref 164–446)
PMV BLD AUTO: 11.5 FL (ref 9–12.9)
POTASSIUM SERPL-SCNC: 3.9 MMOL/L (ref 3.6–5.5)
PROT SERPL-MCNC: 7.6 G/DL (ref 6–8.2)
RBC # BLD AUTO: 4.71 M/UL (ref 4.2–5.4)
SODIUM SERPL-SCNC: 139 MMOL/L (ref 135–145)
WBC # BLD AUTO: 6.6 K/UL (ref 4.8–10.8)

## 2022-12-16 PROCEDURE — 80053 COMPREHEN METABOLIC PANEL: CPT

## 2022-12-16 PROCEDURE — 84703 CHORIONIC GONADOTROPIN ASSAY: CPT

## 2022-12-16 PROCEDURE — 76801 OB US < 14 WKS SINGLE FETUS: CPT

## 2022-12-16 PROCEDURE — A9270 NON-COVERED ITEM OR SERVICE: HCPCS | Performed by: EMERGENCY MEDICINE

## 2022-12-16 PROCEDURE — 85025 COMPLETE CBC W/AUTO DIFF WBC: CPT

## 2022-12-16 PROCEDURE — 36415 COLL VENOUS BLD VENIPUNCTURE: CPT

## 2022-12-16 PROCEDURE — 700102 HCHG RX REV CODE 250 W/ 637 OVERRIDE(OP): Performed by: EMERGENCY MEDICINE

## 2022-12-16 PROCEDURE — 84702 CHORIONIC GONADOTROPIN TEST: CPT

## 2022-12-16 RX ORDER — OXYCODONE HYDROCHLORIDE 5 MG/1
5 TABLET ORAL ONCE
Status: COMPLETED | OUTPATIENT
Start: 2022-12-16 | End: 2022-12-16

## 2022-12-16 RX ADMIN — OXYCODONE 2.5 MG: 5 TABLET ORAL at 03:40

## 2022-12-16 ASSESSMENT — LIFESTYLE VARIABLES: DO YOU DRINK ALCOHOL: NO

## 2022-12-16 NOTE — ED NOTES
[facility]charge instructions given and pt has no follow up questions. Vitals and condition stable for discharge

## 2022-12-16 NOTE — ED PROVIDER NOTES
ED Provider Note    CHIEF COMPLAINT  Chief Complaint   Patient presents with    Abdominal Pain     Arrives with c/o lower abd cramping pain and vaginal bleeding x 2 days  States began having light bleeding 2 days ago with cramping, earlier period than she expected  Cramping worsened yesterday with only spotting, tonight the cramping became unbearable with heavy bleeding  Woke up with a soaked pad and pants, 3 clots sizes of limes, intermittent cramping continues  Hx of ovarian cysts       Vaginal Bleeding       HPI  Farzaneh Acosta is a 32 y.o. female who presents to the emergency department with vaginal bleeding and lower abdominal discomfort.  Past medical history significant for depression.   with 1 prior miscarriage.  States that her periods are typically quite regular with only mild variance.  This month however she started bleeding earlier this week which was 5 days early from her expected menstrual cycle.  She had an initial 2 days of bleeding which then went to more light bleeding and then now tonight becoming heavy yet again.  Again she has been having waxing waning abdominal cramping consistent with an slightly worse than her typical menstrual cramps.  Currently only mild.  Became concerned over the clots that were passed.    Denies any fever chills.    REVIEW OF SYSTEMS  See HPI for further details. All other systems are negative.     PAST MEDICAL HISTORY       SOCIAL HISTORY  Social History     Tobacco Use    Smoking status: Never    Smokeless tobacco: Never   Substance and Sexual Activity    Alcohol use: Yes    Drug use: Not Currently    Sexual activity: Not on file       SURGICAL HISTORY  patient denies any surgical history    CURRENT MEDICATIONS  Home Medications       Reviewed by Claudio Flor R.N. (Registered Nurse) on 22 at 0008  Med List Status: Not Addressed     Medication Last Dose Status   Prenatal Vit-Fe Fumarate-FA (PRENATAL VITAMINS PO)  Active               "      ALLERGIES  Allergies   Allergen Reactions    Sulfa Drugs Vomiting     \" I projectile vomit. \"        PHYSICAL EXAM  VITAL SIGNS: /65   Pulse 85   Temp 36.7 °C (98 °F) (Temporal)   Resp 17   Ht 1.829 m (6')   Wt 76.6 kg (168 lb 14 oz)   LMP 12/14/2022 (Approximate)   SpO2 98%   BMI 22.90 kg/m²  @DENITA[[id number]::@   Pulse ox interpretation: I interpret this pulse ox as normal.  Constitutional: Alert in no apparent distress.  HENT: No signs of trauma, Bilateral external ears normal, Nose normal.   Eyes: Pupils are equal and reactive  Neck: Normal range of motion, No tenderness, Supple  Cardiovascular: Regular rate and rhythm, no murmurs.   Thorax & Lungs: Normal breath sounds, No respiratory distress, No wheezing, No chest tenderness.   Abdomen: Bowel sounds normal, Soft, mild suprapubic tenderness  Skin: Warm, Dry, No erythema, No rash.   Extremities: Intact distal pulses  Musculoskeletal: Good range of motion in all major joints. No tenderness to palpation or major deformities noted.   Neurologic: Alert , Normal motor function, Normal sensory function, No focal deficits noted.   Psychiatric: Affect normal, Judgment normal, Mood normal.       DIAGNOSTIC STUDIES / PROCEDURES      LABS  Results for orders placed or performed during the hospital encounter of 12/16/22   CBC WITH DIFFERENTIAL   Result Value Ref Range    WBC 6.6 4.8 - 10.8 K/uL    RBC 4.71 4.20 - 5.40 M/uL    Hemoglobin 14.2 12.0 - 16.0 g/dL    Hematocrit 43.4 37.0 - 47.0 %    MCV 92.1 81.4 - 97.8 fL    MCH 30.1 27.0 - 33.0 pg    MCHC 32.7 (L) 33.6 - 35.0 g/dL    RDW 42.0 35.9 - 50.0 fL    Platelet Count 235 164 - 446 K/uL    MPV 11.5 9.0 - 12.9 fL    Neutrophils-Polys 62.10 44.00 - 72.00 %    Lymphocytes 30.40 22.00 - 41.00 %    Monocytes 5.50 0.00 - 13.40 %    Eosinophils 1.20 0.00 - 6.90 %    Basophils 0.50 0.00 - 1.80 %    Immature Granulocytes 0.30 0.00 - 0.90 %    Nucleated RBC 0.00 /100 WBC    Neutrophils (Absolute) 4.08 2.00 - 7.15 " K/uL    Lymphs (Absolute) 2.00 1.00 - 4.80 K/uL    Monos (Absolute) 0.36 0.00 - 0.85 K/uL    Eos (Absolute) 0.08 0.00 - 0.51 K/uL    Baso (Absolute) 0.03 0.00 - 0.12 K/uL    Immature Granulocytes (abs) 0.02 0.00 - 0.11 K/uL    NRBC (Absolute) 0.00 K/uL   COMP METABOLIC PANEL   Result Value Ref Range    Sodium 139 135 - 145 mmol/L    Potassium 3.9 3.6 - 5.5 mmol/L    Chloride 103 96 - 112 mmol/L    Co2 26 20 - 33 mmol/L    Anion Gap 10.0 7.0 - 16.0    Glucose 102 (H) 65 - 99 mg/dL    Bun 9 8 - 22 mg/dL    Creatinine 0.57 0.50 - 1.40 mg/dL    Calcium 9.9 8.5 - 10.5 mg/dL    AST(SGOT) 19 12 - 45 U/L    ALT(SGPT) 14 2 - 50 U/L    Alkaline Phosphatase 66 30 - 99 U/L    Total Bilirubin 0.5 0.1 - 1.5 mg/dL    Albumin 4.5 3.2 - 4.9 g/dL    Total Protein 7.6 6.0 - 8.2 g/dL    Globulin 3.1 1.9 - 3.5 g/dL    A-G Ratio 1.5 g/dL   HCG QUAL SERUM   Result Value Ref Range    Beta-Hcg Qualitative Serum Positive (A) Negative   BETA-HCG QUANTITATIVE SERUM   Result Value Ref Range    Bhcg 149.0 (H) 0.0 - 5.0 mIU/mL   CORRECTED CALCIUM   Result Value Ref Range    Correct Calcium 9.5 8.5 - 10.5 mg/dL   ESTIMATED GFR   Result Value Ref Range    GFR (CKD-EPI) 124 >60 mL/min/1.73 m 2         RADIOLOGY  US-OB 1ST TRIMESTER WITH TRANSVAGINAL (COMBO)   Final Result         1.  No definitive intrauterine pregnancy is identified. Hypoechoic area in the uterus is seen with irregular margins which could represent atypical gestational sac without visualized fetal pole versus pseudogestational sac or hemorrhage within the    endometrial canal. Occult ectopic pregnancy is not sonographically excluded.   2.  Small moderate hemorrhagic appearing fluid collection in the posterior cul-de-sac and right adnexa.      These findings were discussed with the patient's clinician, Joey Medeiros, on 12/16/2022 3:42 AM.              COURSE & MEDICAL DECISION MAKING  Pertinent Labs & Imaging studies reviewed. (See chart for details)  32-year-old female presenting  to the emergency department with vaginal bleeding.  History as above.  Screening blood work positive for pregnancy.  Additional beta quant completed and documented above.  Ultrasound imaging also as document above.  Radiologist did call me to discuss directly.  At this point I have conveyed the findings with the patient.  We will have return precautions within 40 to 72 hours for repeat beta-hCG and imaging.  She is also to call her OB/GYN today for further outpatient planning.  She will continue with Tylenol for pain control.   The patient will return for worsening symptoms and is stable at the time of discharge. The patient verbalizes understanding and will comply.    FINAL IMPRESSION  1. Threatened miscarriage            Electronically signed by: Joey Medeiros M.D., 12/16/2022 2:46 AM

## 2022-12-16 NOTE — ED TRIAGE NOTES
Chief Complaint   Patient presents with    Abdominal Pain     Arrives with c/o lower abd cramping pain and vaginal bleeding x 2 days  States began having light bleeding 2 days ago with cramping, earlier period than she expected  Cramping worsened yesterday with only spotting, tonight the cramping became unbearable with heavy bleeding  Woke up with a soaked pad and pants, 3 clots sizes of limes, intermittent cramping continues  Hx of ovarian cysts       Vaginal Bleeding     /65   Pulse 88   Temp 37.1 °C (98.8 °F) (Temporal)   Resp 16   Ht 1.829 m (6')   Wt 76.6 kg (168 lb 14 oz)   LMP 12/14/2022 (Approximate)   SpO2 98%   BMI 22.90 kg/m²     Pt made aware of triage process, placed back into lobby, educated pt to tell staff of any worsening of symptoms

## 2024-04-02 ENCOUNTER — HOSPITAL ENCOUNTER (OUTPATIENT)
Facility: MEDICAL CENTER | Age: 34
End: 2024-04-02
Attending: NURSE PRACTITIONER
Payer: COMMERCIAL

## 2024-04-02 ENCOUNTER — EH NON-PROVIDER (OUTPATIENT)
Dept: OCCUPATIONAL MEDICINE | Facility: CLINIC | Age: 34
End: 2024-04-02

## 2024-04-02 DIAGNOSIS — Z02.89 VISIT FOR OCCUPATIONAL HEALTH EXAMINATION: Primary | ICD-10-CM

## 2024-04-02 DIAGNOSIS — Z02.89 VISIT FOR OCCUPATIONAL HEALTH EXAMINATION: ICD-10-CM

## 2024-04-02 LAB
AMP AMPHETAMINE: NORMAL
BAR BARBITURATES: NORMAL
BZO BENZODIAZEPINES: NORMAL
COC COCAINE: NORMAL
INT CON NEG: NORMAL
INT CON POS: NORMAL
MDMA ECSTASY: NORMAL
MET METHAMPHETAMINES: NORMAL
MTD METHADONE: NORMAL
OPI OPIATES: NORMAL
OXY OXYCODONE: NORMAL
PCP PHENCYCLIDINE: NORMAL
POC URINE DRUG SCREEN OCDRS: NORMAL
THC: NORMAL

## 2024-04-02 PROCEDURE — 86735 MUMPS ANTIBODY: CPT | Performed by: NURSE PRACTITIONER

## 2024-04-02 PROCEDURE — 90715 TDAP VACCINE 7 YRS/> IM: CPT | Performed by: NURSE PRACTITIONER

## 2024-04-02 PROCEDURE — 80305 DRUG TEST PRSMV DIR OPT OBS: CPT | Performed by: NURSE PRACTITIONER

## 2024-04-02 PROCEDURE — 86762 RUBELLA ANTIBODY: CPT | Performed by: NURSE PRACTITIONER

## 2024-04-02 PROCEDURE — 86480 TB TEST CELL IMMUN MEASURE: CPT | Performed by: NURSE PRACTITIONER

## 2024-04-02 PROCEDURE — 86787 VARICELLA-ZOSTER ANTIBODY: CPT | Performed by: NURSE PRACTITIONER

## 2024-04-02 PROCEDURE — 86765 RUBEOLA ANTIBODY: CPT | Performed by: NURSE PRACTITIONER

## 2024-04-02 PROCEDURE — 90471 IMMUNIZATION ADMIN: CPT | Performed by: NURSE PRACTITIONER

## 2024-04-02 NOTE — PROGRESS NOTES
Pre Employment Drug Screen Completed.  No Physical Required.  Tb, MMR, and VZV drawn   Tdap Admin   Flu - N/A   COVID Declined   Hep B Declined.

## 2024-04-04 LAB
GAMMA INTERFERON BACKGROUND BLD IA-ACNC: 0.03 IU/ML
M TB IFN-G BLD-IMP: NEGATIVE
M TB IFN-G CD4+ BCKGRND COR BLD-ACNC: 0 IU/ML
MEV IGG SER-ACNC: 112 AU/ML
MITOGEN IGNF BCKGRD COR BLD-ACNC: 3.31 IU/ML
MUV IGG SER IA-ACNC: <5 AU/ML
QFT TB2 - NIL TBQ2: 0 IU/ML
RUBV AB SER QL: 56.6 IU/ML
VZV IGG SER IA-ACNC: 1058 IV

## 2024-04-09 ENCOUNTER — TELEPHONE (OUTPATIENT)
Dept: OCCUPATIONAL MEDICINE | Facility: CLINIC | Age: 34
End: 2024-04-09
Payer: COMMERCIAL

## 2024-04-09 NOTE — TELEPHONE ENCOUNTER
Mumps titer is non-immune. Will need to receive the MMR vaccine series for employment with [employer].      LVM. Did not leave a detailed message.

## 2024-04-10 ENCOUNTER — EH NON-PROVIDER (OUTPATIENT)
Dept: OCCUPATIONAL MEDICINE | Facility: CLINIC | Age: 34
End: 2024-04-10

## 2024-04-10 DIAGNOSIS — Z23 ENCOUNTER FOR ADMINISTRATION OF VACCINE: Primary | ICD-10-CM

## 2024-04-10 PROCEDURE — 90707 MMR VACCINE SC: CPT | Performed by: NURSE PRACTITIONER

## 2024-05-10 ENCOUNTER — EH NON-PROVIDER (OUTPATIENT)
Dept: OCCUPATIONAL MEDICINE | Facility: CLINIC | Age: 34
End: 2024-05-10

## 2024-05-10 DIAGNOSIS — Z23 ENCOUNTER FOR ADMINISTRATION OF VACCINE: Primary | ICD-10-CM

## 2024-05-10 PROCEDURE — 90707 MMR VACCINE SC: CPT | Performed by: NURSE PRACTITIONER

## 2024-05-14 ENCOUNTER — NON-PROVIDER VISIT (OUTPATIENT)
Dept: OCCUPATIONAL MEDICINE | Facility: CLINIC | Age: 34
End: 2024-05-14

## 2024-05-14 DIAGNOSIS — Z02.1 PRE-EMPLOYMENT DRUG SCREENING: ICD-10-CM

## 2024-05-14 LAB
AMP AMPHETAMINE: NORMAL
BAR BARBITURATES: NORMAL
BZO BENZODIAZEPINES: NORMAL
COC COCAINE: NORMAL
INT CON NEG: NORMAL
INT CON POS: NORMAL
MDMA ECSTASY: NORMAL
MET METHAMPHETAMINES: NORMAL
MTD METHADONE: NORMAL
OPI OPIATES: NORMAL
OXY OXYCODONE: NORMAL
PCP PHENCYCLIDINE: NORMAL
POC URINE DRUG SCREEN OCDRS: NEGATIVE
THC: NORMAL

## 2024-05-14 PROCEDURE — 80305 DRUG TEST PRSMV DIR OPT OBS: CPT | Performed by: PREVENTIVE MEDICINE

## 2024-06-21 ENCOUNTER — OFFICE VISIT (OUTPATIENT)
Dept: URGENT CARE | Facility: PHYSICIAN GROUP | Age: 34
End: 2024-06-21
Payer: COMMERCIAL

## 2024-06-21 ENCOUNTER — HOSPITAL ENCOUNTER (OUTPATIENT)
Dept: RADIOLOGY | Facility: MEDICAL CENTER | Age: 34
End: 2024-06-21
Attending: PHYSICIAN ASSISTANT
Payer: COMMERCIAL

## 2024-06-21 VITALS
DIASTOLIC BLOOD PRESSURE: 70 MMHG | SYSTOLIC BLOOD PRESSURE: 102 MMHG | WEIGHT: 176.6 LBS | HEART RATE: 61 BPM | RESPIRATION RATE: 16 BRPM | HEIGHT: 72 IN | BODY MASS INDEX: 23.92 KG/M2 | TEMPERATURE: 98.7 F | OXYGEN SATURATION: 99 %

## 2024-06-21 DIAGNOSIS — S86.911A STRAIN OF RIGHT KNEE, INITIAL ENCOUNTER: ICD-10-CM

## 2024-06-21 DIAGNOSIS — S89.90XA KNEE INJURY, INITIAL ENCOUNTER: ICD-10-CM

## 2024-06-21 PROCEDURE — 3074F SYST BP LT 130 MM HG: CPT | Performed by: PHYSICIAN ASSISTANT

## 2024-06-21 PROCEDURE — 3078F DIAST BP <80 MM HG: CPT | Performed by: PHYSICIAN ASSISTANT

## 2024-06-21 PROCEDURE — 99203 OFFICE O/P NEW LOW 30 MIN: CPT | Performed by: PHYSICIAN ASSISTANT

## 2024-06-21 PROCEDURE — 73564 X-RAY EXAM KNEE 4 OR MORE: CPT | Mod: RT

## 2024-06-21 ASSESSMENT — ENCOUNTER SYMPTOMS
TINGLING: 0
WEAKNESS: 0
FOCAL WEAKNESS: 0
VOMITING: 0
NAUSEA: 0
FEVER: 0
SENSORY CHANGE: 0
CHILLS: 0

## 2024-06-21 ASSESSMENT — FIBROSIS 4 INDEX: FIB4 SCORE: 0.71

## 2024-06-21 NOTE — PROGRESS NOTES
Subjective     Farzaneh Acosta is a 34 y.o. female who presents with Knee Pain ((R) knee pain, stepped the wrong way, it gave out and hit it on an object X last night )            Patient stepped the wrong way at her silva at the [employer]. Her right knee gave out on her and she hit her kneecap on and object. She reports moderate pain and swelling around her knee. Pain is located on the outside of the kneecap. She has history of right knee surgery. She reports the surgery was laparoscopic  and cleaned up her joint and a ligament. She is able to bear weight. It is popping. She is having difficulty flexing her knee.        History reviewed. No pertinent past medical history.    History reviewed. No pertinent surgical history.    History reviewed. No pertinent family history.    Allergies:  Sulfa drugs      Medications, Allergies, and current problem list reviewed today in Epic    Review of Systems   Constitutional:  Negative for chills, fever and malaise/fatigue.   Gastrointestinal:  Negative for nausea and vomiting.   Musculoskeletal:  Positive for joint pain (right knee).   Skin:  Negative for rash.   Neurological:  Negative for tingling, sensory change, focal weakness and weakness.        All other systems reviewed and are negative.         Objective     /70 (BP Location: Right arm, Patient Position: Sitting, BP Cuff Size: Adult)   Pulse 61   Temp 37.1 °C (98.7 °F) (Temporal)   Resp 16   Ht 1.829 m (6')   Wt 80.1 kg (176 lb 9.6 oz)   SpO2 99%   BMI 23.95 kg/m²      Physical Exam  Constitutional:       General: She is not in acute distress.     Appearance: She is not ill-appearing.   HENT:      Head: Normocephalic and atraumatic.   Eyes:      Conjunctiva/sclera: Conjunctivae normal.   Cardiovascular:      Rate and Rhythm: Normal rate and regular rhythm.   Pulmonary:      Effort: Pulmonary effort is normal. No respiratory distress.      Breath sounds: No stridor. No wheezing.   Musculoskeletal:       Right knee: Swelling present. Decreased range of motion (stopping point with flexion). Tenderness present over the lateral joint line. Normal patellar mobility.      Instability Tests: Anterior drawer test negative. Posterior drawer test negative. Anterior Lachman test negative. Lateral Deysi test positive. Medial Deysi test negative.      Comments: Right lower leg with distal n/v intact.    Skin:     General: Skin is warm and dry.      Findings: No bruising or erythema.   Neurological:      General: No focal deficit present.      Mental Status: She is alert and oriented to person, place, and time.   Psychiatric:         Mood and Affect: Mood normal.         Behavior: Behavior normal.         Thought Content: Thought content normal.         Judgment: Judgment normal.             6/21/2024 1:00 PM     HISTORY/REASON FOR EXAM:  Pain/Deformity Following Trauma.  Right knee pain     TECHNIQUE/EXAM DESCRIPTION AND NUMBER OF VIEWS:  4 views of the RIGHT knee.     COMPARISON: None     FINDINGS:     There is no fracture.     Alignment is normal.     No degenerative changes are present.  IMPRESSION:     Negative right knee series                Assessment & Plan        1. Strain of right knee, initial encounter  DX-KNEE COMPLETE 4+ RIGHT          - DX-KNEE COMPLETE 4+ RIGHT; Future    X-ray negative. No signs of effusion which is reassuring.  Knee brace provided  RICE  OTC NSAIDS  Follow-up with  [facility] as planned for next week,        .Differential diagnoses, Supportive care, and indications for immediate follow-up discussed with patient.   Pathogenesis of diagnosis discussed including typical length and natural progression.   Instructed to return to clinic or nearest emergency department for any change in condition, further concerns, or worsening of symptoms.    The patient demonstrated a good understanding and agreed with the treatment plan.    Susi Nichole P.A.-C.

## 2024-11-11 ENCOUNTER — OFFICE VISIT (OUTPATIENT)
Dept: URGENT CARE | Facility: PHYSICIAN GROUP | Age: 34
End: 2024-11-11
Payer: COMMERCIAL

## 2024-11-11 ENCOUNTER — HOSPITAL ENCOUNTER (OUTPATIENT)
Dept: RADIOLOGY | Facility: MEDICAL CENTER | Age: 34
End: 2024-11-11
Attending: STUDENT IN AN ORGANIZED HEALTH CARE EDUCATION/TRAINING PROGRAM
Payer: COMMERCIAL

## 2024-11-11 VITALS
DIASTOLIC BLOOD PRESSURE: 62 MMHG | HEART RATE: 85 BPM | SYSTOLIC BLOOD PRESSURE: 114 MMHG | WEIGHT: 184 LBS | TEMPERATURE: 98.4 F | OXYGEN SATURATION: 100 % | BODY MASS INDEX: 24.92 KG/M2 | HEIGHT: 72 IN | RESPIRATION RATE: 16 BRPM

## 2024-11-11 DIAGNOSIS — J40 BRONCHITIS: ICD-10-CM

## 2024-11-11 DIAGNOSIS — Z3A.01 LESS THAN 8 WEEKS GESTATION OF PREGNANCY: ICD-10-CM

## 2024-11-11 LAB
POCT INT CON NEG: NEGATIVE
POCT INT CON POS: POSITIVE
POCT URINE PREGNANCY TEST: POSITIVE

## 2024-11-11 PROCEDURE — 81025 URINE PREGNANCY TEST: CPT | Performed by: STUDENT IN AN ORGANIZED HEALTH CARE EDUCATION/TRAINING PROGRAM

## 2024-11-11 PROCEDURE — 99214 OFFICE O/P EST MOD 30 MIN: CPT | Mod: 25 | Performed by: STUDENT IN AN ORGANIZED HEALTH CARE EDUCATION/TRAINING PROGRAM

## 2024-11-11 PROCEDURE — 94640 AIRWAY INHALATION TREATMENT: CPT | Performed by: STUDENT IN AN ORGANIZED HEALTH CARE EDUCATION/TRAINING PROGRAM

## 2024-11-11 PROCEDURE — 71046 X-RAY EXAM CHEST 2 VIEWS: CPT

## 2024-11-11 PROCEDURE — 3078F DIAST BP <80 MM HG: CPT | Performed by: STUDENT IN AN ORGANIZED HEALTH CARE EDUCATION/TRAINING PROGRAM

## 2024-11-11 PROCEDURE — 3074F SYST BP LT 130 MM HG: CPT | Performed by: STUDENT IN AN ORGANIZED HEALTH CARE EDUCATION/TRAINING PROGRAM

## 2024-11-11 RX ORDER — IPRATROPIUM BROMIDE AND ALBUTEROL SULFATE 2.5; .5 MG/3ML; MG/3ML
3 SOLUTION RESPIRATORY (INHALATION) ONCE
Status: COMPLETED | OUTPATIENT
Start: 2024-11-11 | End: 2024-11-11

## 2024-11-11 RX ADMIN — IPRATROPIUM BROMIDE AND ALBUTEROL SULFATE 3 ML: 2.5; .5 SOLUTION RESPIRATORY (INHALATION) at 10:35

## 2024-11-11 ASSESSMENT — FIBROSIS 4 INDEX: FIB4 SCORE: 0.71

## 2024-11-11 NOTE — PROGRESS NOTES
"Subjective:   CHIEF COMPLAINT  Chief Complaint   Patient presents with    Nasal Congestion     X 3 days with cough, chest congestion, body aches, headache.        HPI    History of Present Illness  Farzaneh Acosta is a 34 y.o. female who presents presents for evaluation of cough and congestion x4 days.    She began feeling unwell on Saturday, initially attributing it to fatigue from caring for her toddler, recently diagnosed with lobar pneumonia.  Since Saturday, her condition has worsened. She reports significant nasal and chest congestion, accompanied by a burning sensation in her chest when she coughs. She has been producing green mucus for the past 3 days, but reports no blood in her sputum. Despite taking DayQuil and NyQuil, her symptoms persist.    She reports no wheezing or shortness of breath and has not had any fevers in the past few days. She also reports no vomiting, but does mention gagging. She has no history of asthma and has never needed to use an inhaler.    Her son was diagnosed with pneumonia last week.    Patient also reports she is a couple weeks late with her menstrual cycle.    SOCIAL HISTORY  She does not smoke.        REVIEW OF SYSTEMS  General: no fever or chills  GI: no nausea or vomiting  See HPI for further details.    PAST MEDICAL HISTORY  There are no active problems to display for this patient.      SURGICAL HISTORY  patient denies any surgical history    ALLERGIES  Allergies   Allergen Reactions    Sulfa Drugs Vomiting     \" I projectile vomit. \"        CURRENT MEDICATIONS  ipratropium-albuterol  PRENATAL VITAMINS PO    SOCIAL HISTORY  Social History     Tobacco Use    Smoking status: Never    Smokeless tobacco: Never   Vaping Use    Vaping status: Never Used   Substance and Sexual Activity    Alcohol use: Yes    Drug use: Not Currently    Sexual activity: Not on file       FAMILY HISTORY  No family history on file.       Objective:   PHYSICAL EXAM  VITAL SIGNS: /62   " Pulse 85   Temp 36.9 °C (98.4 °F) (Temporal)   Resp 16   Ht 1.829 m (6')   Wt 83.5 kg (184 lb)   LMP 11/08/2024   SpO2 100%   BMI 24.95 kg/m²     Gen: no acute distress, normal voice  Skin: dry, intact, moist mucosal membranes  Eyes: No conjunctival injection bilaterally.  Neck: Normal range of motion. No meningeal signs.   Lungs: No increased work of breathing.  CTAB w/ symmetric expansion  CV: RRR w/o murmurs or clicks  Psych: normal affect, normal judgement, alert, awake      RADIOLOGY RESULTS   DX-CHEST-2 VIEWS    Result Date: 11/11/2024 11/11/2024 10:43 AM HISTORY/REASON FOR EXAM:  Cough Congestion. TECHNIQUE/EXAM DESCRIPTION AND NUMBER OF VIEWS: Two views of the chest. COMPARISON:  None available. FINDINGS: Cardiomediastinal contour is within normal limits. No focal pulmonary consolidation. No pleural fluid collection or pneumothorax. No major bony abnormality is seen.     No acute cardiopulmonary disease.             Assessment/Plan:     1. Bronchitis  DX-CHEST-2 VIEWS    ipratropium-albuterol (DUONEB) nebulizer solution    POCT PREGNANCY      2. Less than 8 weeks gestation of pregnancy        1) viral etiology with likely with some lower airway reactivity.  Patient was given a breathing treatment and she stated this seemed to worsen the burning sensation.  Denies experiencing any reflux.  Chest x-ray is unremarkable for any acute cardiopulmonary process.  Afebrile without any tachycardia or hypoxia.  No pleuritic symptoms.  Should be self-limiting.  Recommended symptomatic treatment with OTCs.  Handout was provided reviewing safe medications to take during pregnancy. Return to urgent care any new/worsening symptoms or further questions or concerns.  Patient understood everything discussed.  All questions were answered.    2) LMP 10/18/2024. EGA 3w 3d patient is currently established with OB/GYN.  She has had 3 previous miscarriages.  Instructed to take prenatal/prenatal vitamins.  Handout was  provided.    31 minutes was spent caring for this patient on the day of the encounter which included review of previous records, providing handouts, face-to-face time, discussing the diagnosis, medical management, follow-up, return/emergency room precautions and completion of the chart. This does not include time spent on separately billable procedures/tests.    Please note that this dictation was created using voice recognition software. I have made a reasonable attempt to correct obvious errors, but I expect that there are errors of grammar and possibly content that I did not discover before finalizing the note.